# Patient Record
Sex: MALE | Race: WHITE | URBAN - METROPOLITAN AREA
[De-identification: names, ages, dates, MRNs, and addresses within clinical notes are randomized per-mention and may not be internally consistent; named-entity substitution may affect disease eponyms.]

---

## 2022-03-19 PROBLEM — I10 BENIGN HYPERTENSION: Status: ACTIVE | Noted: 2020-02-05

## 2022-03-19 PROBLEM — M51.369 DDD (DEGENERATIVE DISC DISEASE), LUMBAR: Status: ACTIVE | Noted: 2020-01-14

## 2022-03-19 PROBLEM — M51.36 DDD (DEGENERATIVE DISC DISEASE), LUMBAR: Status: ACTIVE | Noted: 2020-01-14

## 2022-03-20 PROBLEM — J30.89 ENVIRONMENTAL AND SEASONAL ALLERGIES: Status: ACTIVE | Noted: 2019-12-09

## 2024-01-10 ENCOUNTER — OFFICE VISIT (OUTPATIENT)
Dept: ORTHOPEDIC SURGERY | Age: 39
End: 2024-01-10
Payer: COMMERCIAL

## 2024-01-10 DIAGNOSIS — M51.36 DISC DEGENERATION, LUMBAR: Primary | ICD-10-CM

## 2024-01-10 PROCEDURE — 99214 OFFICE O/P EST MOD 30 MIN: CPT | Performed by: PHYSICAL MEDICINE & REHABILITATION

## 2024-01-10 RX ORDER — PREDNISONE 10 MG/1
TABLET ORAL
Qty: 48 TABLET | Refills: 0 | Status: SHIPPED | OUTPATIENT
Start: 2024-01-10

## 2024-01-10 NOTE — PROGRESS NOTES
[facility]  Consultation Note    Patient ID:  Name: Pradip Bah  MRN: [id number]  AGE: 38 y.o.  : 1985    Date of Consultation:  January 10, 2024    CC:   Chief Complaint   Patient presents with    Back Pain         HPI:  Mr. Bah is a 38-year-old man who presents today for follow-up of low back pain.  He has had relief in the past with intermittent right L5-S1 transforaminal epidural steroid injection, most recently performed 2021.  Over the last year, his pain has been somewhat worse.  He thinks that the most recent exacerbation was because he was struggling with right plantar fasciitis.  He has been wearing more flat boots, and that has resolved the plantar fasciitis, and his back pain has improved some as well.  The pain is in the right low back.  It radiates to the right buttocks.  It does not radiate to the legs or feet.  The pain is aggravated when he is on his feet a lot or lying on his right side.  It is alleviated with lying flat.  He has been using a zero gravity chair at home, and that helps a lot as well.  The pain is associated with nighttime awakening.  Ibuprofen and tizanidine have helped some in the past, but he is not taking those now.    MRI lumbar spine 2019 showed L4-5 DDD with bilateral L4-5 lateral recess narrowing, right greater than left and active facet synovitis, worst at L4-5.     Past Medical History Includes:   Past Medical History:   Diagnosis Date    Allergies     DDD (degenerative disc disease), lumbar 2020    ALLEN Serrano. ALEXANDRA. Meloxicam.    ,   Past Surgical History:   Procedure Laterality Date    HERNIA REPAIR Right     inguinal    OTHER SURGICAL HISTORY      tendon on RT hand   -RT index finger    TONSILLECTOMY       Family History:   Family History   Problem Relation Age of Onset    No Known Problems Mother     No Known Problems Sister     No Known Problems Father       Social History:   Social History     Tobacco

## 2024-02-15 ENCOUNTER — PATIENT MESSAGE (OUTPATIENT)
Dept: ORTHOPEDIC SURGERY | Age: 39
End: 2024-02-15

## 2024-02-16 RX ORDER — TIZANIDINE 4 MG/1
TABLET ORAL
Qty: 45 TABLET | Refills: 5 | Status: SHIPPED | OUTPATIENT
Start: 2024-02-16

## 2024-02-16 RX ORDER — IBUPROFEN 800 MG/1
800 TABLET ORAL 3 TIMES DAILY PRN
Qty: 90 TABLET | Refills: 5 | Status: SHIPPED | OUTPATIENT
Start: 2024-02-16

## 2024-05-20 RX ORDER — TIZANIDINE 4 MG/1
TABLET ORAL
Qty: 90 TABLET | Refills: 2 | OUTPATIENT
Start: 2024-05-20

## 2024-08-26 RX ORDER — IBUPROFEN 800 MG/1
TABLET, FILM COATED ORAL
Qty: 90 TABLET | Refills: 5 | OUTPATIENT
Start: 2024-08-26